# Patient Record
Sex: FEMALE | Race: WHITE | NOT HISPANIC OR LATINO | URBAN - METROPOLITAN AREA
[De-identification: names, ages, dates, MRNs, and addresses within clinical notes are randomized per-mention and may not be internally consistent; named-entity substitution may affect disease eponyms.]

---

## 2020-01-02 ENCOUNTER — EMERGENCY (EMERGENCY)
Facility: HOSPITAL | Age: 50
LOS: 1 days | Discharge: HOME | End: 2020-01-02
Attending: EMERGENCY MEDICINE | Admitting: EMERGENCY MEDICINE
Payer: COMMERCIAL

## 2020-01-02 VITALS
HEART RATE: 110 BPM | WEIGHT: 225.09 LBS | OXYGEN SATURATION: 100 % | TEMPERATURE: 98 F | SYSTOLIC BLOOD PRESSURE: 184 MMHG | DIASTOLIC BLOOD PRESSURE: 122 MMHG | RESPIRATION RATE: 14 BRPM

## 2020-01-02 VITALS
TEMPERATURE: 98 F | SYSTOLIC BLOOD PRESSURE: 160 MMHG | HEART RATE: 77 BPM | OXYGEN SATURATION: 100 % | RESPIRATION RATE: 18 BRPM | DIASTOLIC BLOOD PRESSURE: 70 MMHG

## 2020-01-02 LAB
ALBUMIN SERPL ELPH-MCNC: 4 G/DL — SIGNIFICANT CHANGE UP (ref 3.3–5)
ALP SERPL-CCNC: 92 U/L — SIGNIFICANT CHANGE UP (ref 40–120)
ALT FLD-CCNC: 29 U/L — SIGNIFICANT CHANGE UP (ref 12–78)
ANION GAP SERPL CALC-SCNC: 11 MMOL/L — SIGNIFICANT CHANGE UP (ref 5–17)
AST SERPL-CCNC: 57 U/L — HIGH (ref 15–37)
BILIRUB SERPL-MCNC: 0.6 MG/DL — SIGNIFICANT CHANGE UP (ref 0.2–1.2)
BUN SERPL-MCNC: 16 MG/DL — SIGNIFICANT CHANGE UP (ref 7–23)
CALCIUM SERPL-MCNC: 8.1 MG/DL — LOW (ref 8.5–10.1)
CHLORIDE SERPL-SCNC: 98 MMOL/L — SIGNIFICANT CHANGE UP (ref 96–108)
CO2 SERPL-SCNC: 24 MMOL/L — SIGNIFICANT CHANGE UP (ref 22–31)
CREAT SERPL-MCNC: 0.76 MG/DL — SIGNIFICANT CHANGE UP (ref 0.5–1.3)
GLUCOSE SERPL-MCNC: 132 MG/DL — HIGH (ref 70–99)
HCG SERPL-ACNC: <1 MIU/ML — SIGNIFICANT CHANGE UP
HCT VFR BLD CALC: 38.8 % — SIGNIFICANT CHANGE UP (ref 34.5–45)
HGB BLD-MCNC: 13 G/DL — SIGNIFICANT CHANGE UP (ref 11.5–15.5)
MCHC RBC-ENTMCNC: 30.9 PG — SIGNIFICANT CHANGE UP (ref 27–34)
MCHC RBC-ENTMCNC: 33.5 GM/DL — SIGNIFICANT CHANGE UP (ref 32–36)
MCV RBC AUTO: 92.2 FL — SIGNIFICANT CHANGE UP (ref 80–100)
NRBC # BLD: 0 /100 WBCS — SIGNIFICANT CHANGE UP (ref 0–0)
PLATELET # BLD AUTO: 289 K/UL — SIGNIFICANT CHANGE UP (ref 150–400)
POTASSIUM SERPL-MCNC: 3.5 MMOL/L — SIGNIFICANT CHANGE UP (ref 3.5–5.3)
POTASSIUM SERPL-SCNC: 3.5 MMOL/L — SIGNIFICANT CHANGE UP (ref 3.5–5.3)
PROT SERPL-MCNC: 7.4 G/DL — SIGNIFICANT CHANGE UP (ref 6–8.3)
RBC # BLD: 4.21 M/UL — SIGNIFICANT CHANGE UP (ref 3.8–5.2)
RBC # FLD: 12.6 % — SIGNIFICANT CHANGE UP (ref 10.3–14.5)
SODIUM SERPL-SCNC: 133 MMOL/L — LOW (ref 135–145)
TROPONIN I SERPL-MCNC: <.015 NG/ML — SIGNIFICANT CHANGE UP (ref 0.01–0.04)
WBC # BLD: 6.27 K/UL — SIGNIFICANT CHANGE UP (ref 3.8–10.5)
WBC # FLD AUTO: 6.27 K/UL — SIGNIFICANT CHANGE UP (ref 3.8–10.5)

## 2020-01-02 PROCEDURE — 70450 CT HEAD/BRAIN W/O DYE: CPT | Mod: 26

## 2020-01-02 PROCEDURE — 84702 CHORIONIC GONADOTROPIN TEST: CPT

## 2020-01-02 PROCEDURE — 93010 ELECTROCARDIOGRAM REPORT: CPT

## 2020-01-02 PROCEDURE — 85027 COMPLETE CBC AUTOMATED: CPT

## 2020-01-02 PROCEDURE — 93005 ELECTROCARDIOGRAM TRACING: CPT

## 2020-01-02 PROCEDURE — 96376 TX/PRO/DX INJ SAME DRUG ADON: CPT

## 2020-01-02 PROCEDURE — 84484 ASSAY OF TROPONIN QUANT: CPT

## 2020-01-02 PROCEDURE — 71046 X-RAY EXAM CHEST 2 VIEWS: CPT | Mod: 26

## 2020-01-02 PROCEDURE — 70450 CT HEAD/BRAIN W/O DYE: CPT

## 2020-01-02 PROCEDURE — 96375 TX/PRO/DX INJ NEW DRUG ADDON: CPT

## 2020-01-02 PROCEDURE — 96374 THER/PROPH/DIAG INJ IV PUSH: CPT

## 2020-01-02 PROCEDURE — 99284 EMERGENCY DEPT VISIT MOD MDM: CPT | Mod: 25

## 2020-01-02 PROCEDURE — 99285 EMERGENCY DEPT VISIT HI MDM: CPT

## 2020-01-02 PROCEDURE — 71046 X-RAY EXAM CHEST 2 VIEWS: CPT

## 2020-01-02 PROCEDURE — 36415 COLL VENOUS BLD VENIPUNCTURE: CPT

## 2020-01-02 PROCEDURE — 80053 COMPREHEN METABOLIC PANEL: CPT

## 2020-01-02 RX ORDER — HYDROCHLOROTHIAZIDE 25 MG
12.5 TABLET ORAL ONCE
Refills: 0 | Status: COMPLETED | OUTPATIENT
Start: 2020-01-02 | End: 2020-01-02

## 2020-01-02 RX ORDER — LOSARTAN POTASSIUM 100 MG/1
50 TABLET, FILM COATED ORAL ONCE
Refills: 0 | Status: COMPLETED | OUTPATIENT
Start: 2020-01-02 | End: 2020-01-02

## 2020-01-02 RX ORDER — ONDANSETRON 8 MG/1
4 TABLET, FILM COATED ORAL ONCE
Refills: 0 | Status: COMPLETED | OUTPATIENT
Start: 2020-01-02 | End: 2020-01-02

## 2020-01-02 RX ORDER — HYDRALAZINE HCL 50 MG
10 TABLET ORAL ONCE
Refills: 0 | Status: COMPLETED | OUTPATIENT
Start: 2020-01-02 | End: 2020-01-02

## 2020-01-02 RX ADMIN — Medication 10 MILLIGRAM(S): at 19:49

## 2020-01-02 RX ADMIN — Medication 12.5 MILLIGRAM(S): at 19:49

## 2020-01-02 RX ADMIN — ONDANSETRON 4 MILLIGRAM(S): 8 TABLET, FILM COATED ORAL at 16:30

## 2020-01-02 RX ADMIN — Medication 10 MILLIGRAM(S): at 18:01

## 2020-01-02 RX ADMIN — LOSARTAN POTASSIUM 50 MILLIGRAM(S): 100 TABLET, FILM COATED ORAL at 19:48

## 2020-01-02 NOTE — ED PROVIDER NOTE - PROGRESS NOTE DETAILS
29-Oct-2017 05:16 Pt seen by Dr INGRIS Laughlin. Hydralazine now, outpt fu when improved. Pt with some HTN - given usual BP med (pt took pta but immed vomited it up). José Laughlin - when bp improving, outpt fu. At length discussion with patient regarding nature of the HTN. Discussed results of all diagnostic testing in ED. Discussed chest pain /palp.sob / htn precautions and instructions. Patient demonstrates understanding that a cardiac cause of the chest pain has not been totally excluded, but at this time there is no evidence to warrant an inpatient workup.  Discussed the importance of continued follow-up with Cardiology as outpatient to complete cardiac workup.

## 2020-01-02 NOTE — ED PROVIDER NOTE - OBJECTIVE STATEMENT
50 yo F with hx HTN, no known cardiac dz p/w co feeling dizzy today, co HTN. Pt was at work this am and was feeling some dizzy / dysequilibrium today. Pt was found to have  / 100. Pt then began to become anxious, co some palpitations. Pt then BP elevated further and coworker called EMS to bring pt for eval. No neck / back pain. no numb/ting/focal weak. No FAYE / easy fatigue. No Chest pains. No fever/chills. Minimal ha. Pt vomited x 1 today after lying down. No agg/allev factors. No other inj or co. Pt had reportedly taken her BP med late today.

## 2020-01-02 NOTE — ED PROVIDER NOTE - PATIENT PORTAL LINK FT
You can access the FollowMyHealth Patient Portal offered by [facility] by registering at the following website: [email or web address]. By joining [employer]’s FollowMyHealth portal, you will also be able to view your health information using other applications (apps) compatible with our system.

## 2020-01-02 NOTE — ED ADULT NURSE NOTE - OBJECTIVE STATEMENT
Present to ER with c/o of dizziness and high blood pressure. Pt states she forgot to take her BP medication today. Denies any shortness of breath. Pt had 1 episode of vomiting.

## 2020-01-02 NOTE — ED PROVIDER NOTE - CARE PROVIDER_API CALL
Faustino Laughlin)  Cardiovascular Disease; Internal Medicine  [address] [facility]  [place] [address]  Phone: [phone]  Fax: [phone]  Follow Up Time:

## 2020-01-02 NOTE — CONSULT NOTE ADULT - ASSESSMENT
The patient is a 49 year old female with a history of HTN, hypothyroidism who presents with lightheadedness, presyncope.    Plan:  - Unclear etiology of symptoms - vertigo? BP related?  - ECG with underlying LBBB - she was told she has had this in the past but did not have an echo done  - Check one set of cardiac enzymes  - CXR with no infiltrates  - Check CT head  - Give hydralazine 10 mg IV now  - Patient will continue olmesartan at the current dose consistently and start to check her BPs at home. If they remain elevated, then additional antihypertensive medications will need to be added.  - If above negative, patient can be discharged and follow-up as outpatient for an echo and BP visit

## 2020-01-02 NOTE — ED ADULT NURSE NOTE - NSIMPLEMENTINTERV_GEN_ALL_ED
Implemented All Universal Safety Interventions:  [place] to call system. Call bell, personal items and telephone within reach. Instruct patient to call for assistance. Room bathroom lighting operational. Non-slip footwear when patient is off stretcher. Physically safe environment: no spills, clutter or unnecessary equipment. Stretcher in lowest position, wheels locked, appropriate side rails in place.

## 2020-01-02 NOTE — CONSULT NOTE ADULT - SUBJECTIVE AND OBJECTIVE BOX
History of Present Illness: The patient is a 49 year old female with a history of HTN who presents with lightheadedness, presyncope. She states she has not taken her BP meds for the last few days. She woke up feeling not well. She was lightheaded at times and also nauseous. 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 She went home; nausea persisted and she felt more lightheaded as if she was going to pass out. She took her antihypertensive medication but vomited shortly after.    Past Medical/Surgical History:  HTN    Medications:  Olmesartan    Family History: Non-contributory family history of premature cardiovascular atherosclerotic disease    Social History: No tobacco, alcohol or drug use    Review of Systems:  General: No fevers, chills, weight loss or gain  Skin: No rashes, color changes  Cardiovascular: No chest pain, orthopnea  Respiratory: No shortness of breath, cough  Gastrointestinal: No nausea, abdominal pain  Genitourinary: No incontinence, pain with urination  Musculoskeletal: No pain, swelling, decreased range of motion  Neurological: No headache, weakness  Psychiatric: No depression, anxiety  Endocrine: No weight loss or gain, increased thirst  All other systems are comprehensively negative.    Physical Exam:  Vitals:        Vital Signs Last 24 Hrs  T(C): 36.8 (02 Jan 2020 15:08), Max: 36.8 (02 Jan 2020 15:08)  T(F): 98.2 (02 Jan 2020 15:08), Max: 98.2 (02 Jan 2020 15:08)  HR: 110 (02 Jan 2020 15:08) (110 - 110)  BP: 175/94 (02 Jan 2020 16:23) (175/94 - 184/122)  BP(mean): --  RR: 14 (02 Jan 2020 15:08) (14 - 14)  SpO2: 100% (02 Jan 2020 15:08) (100% - 100%)  General: NAD  HEENT: MMM  Neck: No JVD, no carotid bruit  Lungs: CTAB  CV: RRR, nl S1/S2, no M/R/G  Abdomen: S/NT/ND, +BS  Extremities: No LE edema, no cyanosis  Neuro: AAOx3, non-focal  Skin: No rash    Labs:  Pending    ECG: NSR, LBBB

## 2020-01-03 RX ORDER — LEVOTHYROXINE SODIUM 125 MCG
1 TABLET ORAL
Qty: 0 | Refills: 0 | DISCHARGE

## 2020-01-03 RX ORDER — OLMESARTAN MEDOXOMIL-HYDROCHLOROTHIAZIDE 25; 40 MG/1; MG/1
1 TABLET, FILM COATED ORAL
Qty: 0 | Refills: 0 | DISCHARGE

## 2022-01-02 ENCOUNTER — OUTPATIENT (OUTPATIENT)
Dept: OUTPATIENT SERVICES | Facility: HOSPITAL | Age: 52
LOS: 1 days | End: 2022-01-02
Payer: COMMERCIAL

## 2022-01-02 DIAGNOSIS — Z20.828 CONTACT WITH AND (SUSPECTED) EXPOSURE TO OTHER VIRAL COMMUNICABLE DISEASES: ICD-10-CM

## 2022-01-02 PROCEDURE — U0003: CPT

## 2022-01-02 PROCEDURE — U0005: CPT

## 2023-01-17 ENCOUNTER — APPOINTMENT (OUTPATIENT)
Dept: ORTHOPEDIC SURGERY | Facility: CLINIC | Age: 53
End: 2023-01-17
Payer: OTHER MISCELLANEOUS

## 2023-01-17 DIAGNOSIS — S32.2XXA FRACTURE OF COCCYX, INITIAL ENCOUNTER FOR CLOSED FRACTURE: ICD-10-CM

## 2023-01-17 DIAGNOSIS — I10 ESSENTIAL (PRIMARY) HYPERTENSION: ICD-10-CM

## 2023-01-17 PROCEDURE — 99072 ADDL SUPL MATRL&STAF TM PHE: CPT

## 2023-01-17 PROCEDURE — L1833: CPT | Mod: LT

## 2023-01-17 PROCEDURE — 99204 OFFICE O/P NEW MOD 45 MIN: CPT | Mod: 25

## 2023-01-17 NOTE — PHYSICAL EXAM
[Normal Mood and Affect] : normal mood and affect [Able to Communicate] : able to communicate [Well Developed] : well developed [Well Nourished] : well nourished [NL (90)] : forward flexion 90 degrees [NL (30)] : right lateral bending 30 degrees [Left] : left knee [NL (140)] : flexion 140 degrees [NL (0)] : extension 0 degrees [5___] : hamstring 5[unfilled]/5 [5mm] : openinmm [Fracture] : Fracture [AP] : anteroposterior [Lateral] : lateral [[place]] : skyline [FreeTextEntry8] : tender over tip coccyx only. [FreeTextEntry2] : angulated fx thru terminal segment [] : negative Lachmann [FreeTextEntry9] : intact joint spaces. Small area of comminution lateral border of the lat. tibial plateau. No depression. ?? fx??

## 2023-01-17 NOTE — HISTORY OF PRESENT ILLNESS
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 Felt a "pop" lt navjot before falling. Able to stand and walk. Had increasing pain and swelling lt knee next day. Icing at home. Went to urgent care  where xrays revealed a coccyx fx. OOW since her injury. Has finished a MDP w/ little relief. Worse sitting and standing.\par \par PMH: previous lt knee injury x 30 years ago treated with PT and did well since.\par          had a TPI L-spine > 10 years ago and did well. 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

## 2023-01-24 ENCOUNTER — FORM ENCOUNTER (OUTPATIENT)
Age: 53
End: 2023-01-24

## 2023-01-25 ENCOUNTER — APPOINTMENT (OUTPATIENT)
Dept: MRI IMAGING | Facility: CLINIC | Age: 53
End: 2023-01-25
Payer: OTHER MISCELLANEOUS

## 2023-01-25 PROCEDURE — 99072 ADDL SUPL MATRL&STAF TM PHE: CPT

## 2023-01-25 PROCEDURE — 73721 MRI JNT OF LWR EXTRE W/O DYE: CPT | Mod: LT

## 2023-01-31 ENCOUNTER — APPOINTMENT (OUTPATIENT)
Dept: ORTHOPEDIC SURGERY | Facility: CLINIC | Age: 53
End: 2023-01-31
Payer: OTHER MISCELLANEOUS

## 2023-01-31 VITALS — BODY MASS INDEX: 33.47 KG/M2 | HEIGHT: 69 IN | WEIGHT: 226 LBS

## 2023-01-31 DIAGNOSIS — S83.512A SPRAIN OF ANTERIOR CRUCIATE LIGAMENT OF LEFT KNEE, INITIAL ENCOUNTER: ICD-10-CM

## 2023-01-31 DIAGNOSIS — M23.92 UNSPECIFIED INTERNAL DERANGEMENT OF LEFT KNEE: ICD-10-CM

## 2023-01-31 DIAGNOSIS — Z78.9 OTHER SPECIFIED HEALTH STATUS: ICD-10-CM

## 2023-01-31 PROCEDURE — 99072 ADDL SUPL MATRL&STAF TM PHE: CPT

## 2023-01-31 PROCEDURE — 99213 OFFICE O/P EST LOW 20 MIN: CPT

## 2023-01-31 NOTE — HISTORY OF PRESENT ILLNESS
[Gradual] : gradual [Sudden] : sudden [7] : 7 [Stabbing] : stabbing [Intermittent] : intermittent [Rest] : rest [Exercising] : exercising [Work related] : work related [0] : 0 [Tightness] : tightness [Full time] : Work status: full time [de-identified] :  01/11/23\par \par 01/31/23:   F/U.  Is now about three weeks after injury at work.  Here today for left knee MRI results.  Is doing a little better.  Still has some complaints of medial left knee pain.  Is working full duty despite the injury.  No longer complaining of coccyx pain.\par \par Impression: \par 1. ACL mucoid change with poor visualization of the femoral origin compatible with at least high-grade tear.\par Nondepressed subchondral fracture of the posterior lateral tibia with contusion versus reactive marrow edema \par from cartilage loss posterior medial tibia. Joint effusion.\par 2. Medial meniscal tear.\par 3. Hamstring and gastrocnemius tendinopathy with soft tissue edema.\par 4. 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 Felt a "pop" lt navjot before falling. Able to stand and walk. Had increasing pain and swelling lt knee next day. Icing at home. Went to urgent care  where xrays revealed a coccyx fx. OOW since her injury. Has finished a MDP w/ little relief. Worse sitting and standing.\par \par PMH: previous lt knee injury x 30 years ago treated with PT and did well since.\par          had a TPI L-spine > 10 years ago and did well. 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

## 2023-01-31 NOTE — PHYSICAL EXAM
[Normal Mood and Affect] : normal mood and affect [Able to Communicate] : able to communicate [Well Developed] : well developed [Well Nourished] : well nourished [NL (90)] : forward flexion 90 degrees [NL (30)] : right lateral bending 30 degrees [Fracture] : Fracture [Left] : left knee [NL (140)] : flexion 140 degrees [NL (0)] : extension 0 degrees [5___] : hamstring 5[unfilled]/5 [5mm] : openinmm [AP] : anteroposterior [Lateral] : lateral [[place]] : skyline [FreeTextEntry2] : angulated fx thru terminal segment [Positive] : positive anterior draw [FreeTextEntry8] : tender over tip coccyx only. [] : non-antalgic [FreeTextEntry9] : intact joint spaces. Small area of comminution lateral border of the lat. tibial plateau. No depression. ?? fx??

## 2023-01-31 NOTE — DISCUSSION/SUMMARY
[de-identified] : "Written by Opal Carvalho, acting as Scribe for Alverto Johnson MD."\par \par Dr. Johnson - \par The documentation recorded by the scribe accurately reflects the service I personally performed and the decisions made by me.

## 2023-01-31 NOTE — WORK
[Torn Ligament/Tendon/Muscle] : torn ligament, tendon or muscle [Was the competent medical cause of the injury] : was the competent medical cause of the injury [Are consistent with the injury] : are consistent with the injury [Consistent with my objective findings] : consistent with my objective findings [Partial] : partial [Does not reveal pre-existing condition(s) that may affect treatment/prognosis] : does not reveal pre-existing condition(s) that may affect treatment/prognosis [I provided the services listed above] :  I provided the services listed above. [FreeTextEntry1] : uncertain.  She is working despite pain.

## 2023-02-28 ENCOUNTER — APPOINTMENT (OUTPATIENT)
Dept: ORTHOPEDIC SURGERY | Facility: CLINIC | Age: 53
End: 2023-02-28
Payer: OTHER MISCELLANEOUS

## 2023-02-28 VITALS — WEIGHT: 226 LBS | BODY MASS INDEX: 33.47 KG/M2 | HEIGHT: 69 IN

## 2023-02-28 DIAGNOSIS — S83.512D SPRAIN OF ANTERIOR CRUCIATE LIGAMENT OF LEFT KNEE, SUBSEQUENT ENCOUNTER: ICD-10-CM

## 2023-02-28 PROCEDURE — 99072 ADDL SUPL MATRL&STAF TM PHE: CPT

## 2023-02-28 PROCEDURE — 99213 OFFICE O/P EST LOW 20 MIN: CPT

## 2023-02-28 NOTE — WORK
[Torn Ligament/Tendon/Muscle] : torn ligament, tendon or muscle [Was the competent medical cause of the injury] : was the competent medical cause of the injury [Are consistent with the injury] : are consistent with the injury [Consistent with my objective findings] : consistent with my objective findings [Partial] : partial [Does not reveal pre-existing condition(s) that may affect treatment/prognosis] : does not reveal pre-existing condition(s) that may affect treatment/prognosis [I provided the services listed above] :  I provided the services listed above. [Can return to work without limitations on ______] : can return to work without limitations on [unfilled] [FreeTextEntry1] : uncertain.  She is working despite pain.

## 2023-02-28 NOTE — PHYSICAL EXAM
[Normal Mood and Affect] : normal mood and affect [Able to Communicate] : able to communicate [Well Developed] : well developed [Well Nourished] : well nourished [NL (90)] : forward flexion 90 degrees [NL (30)] : right lateral bending 30 degrees [Left] : left knee [NL (140)] : flexion 140 degrees [NL (0)] : extension 0 degrees [5___] : hamstring 5[unfilled]/5 [Positive] : positive anterior draw [5mm] : openinmm [AP] : anteroposterior [Lateral] : lateral [[place]] : skyline [FreeTextEntry8] : tender over tip coccyx only. [] : non-antalgic [FreeTextEntry9] : intact joint spaces. Small area of comminution lateral border of the lat. tibial plateau. No depression. ?? fx??

## 2023-02-28 NOTE — HISTORY OF PRESENT ILLNESS
[0] : 0 [Stabbing] : stabbing [Tightness] : tightness [Rest] : rest [Exercising] : exercising [6] : 6 [de-identified] :  01/11/23\par \par 02/28/23:  WC F/U.  Is now about 7 weeks after injury at work. Still c/o some pain and buckling lt knee despite PT 2x/week. Interested in ACL recon and menisectomy. Still working full duty.\par \par 01/31/23:  WC F/U.  Is now about three weeks after injury at work.  Here today for left knee MRI results.  Is doing a little better.  Still has some complaints of medial left knee pain.  Is working full duty despite the injury.  No longer complaining of coccyx pain.\par \par Impression: \par 1. ACL mucoid change with poor visualization of the femoral origin compatible with at least high-grade tear.\par Nondepressed subchondral fracture of the posterior lateral tibia with contusion versus reactive marrow edema \par from cartilage loss posterior medial tibia. Joint effusion.\par 2. Medial meniscal tear.\par 3. Hamstring and gastrocnemius tendinopathy with soft tissue edema.\par 4. 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 Felt a "pop" lt navjot before falling. Able to stand and walk. Had increasing pain and swelling lt knee next day. Icing at home. Went to urgent care  where xrays revealed a coccyx fx. OOW since her injury. Has finished a MDP w/ little relief. Worse sitting and standing.\par \par PMH: previous lt knee injury x 30 years ago treated with PT and did well since.\par          had a TPI L-spine > 10 years ago and did well. [] : no [FreeTextEntry5] : patient would like to discuss surgical options today.  [de-identified] : Teacher

## 2023-03-06 ENCOUNTER — APPOINTMENT (OUTPATIENT)
Dept: ORTHOPEDIC SURGERY | Facility: CLINIC | Age: 53
End: 2023-03-06
Payer: OTHER MISCELLANEOUS

## 2023-03-06 VITALS — WEIGHT: 226 LBS | BODY MASS INDEX: 33.47 KG/M2 | HEIGHT: 69 IN

## 2023-03-06 DIAGNOSIS — Z86.39 PERSONAL HISTORY OF OTHER ENDOCRINE, NUTRITIONAL AND METABOLIC DISEASE: ICD-10-CM

## 2023-03-06 PROCEDURE — 99072 ADDL SUPL MATRL&STAF TM PHE: CPT

## 2023-03-06 PROCEDURE — 99214 OFFICE O/P EST MOD 30 MIN: CPT

## 2023-03-08 NOTE — HISTORY OF PRESENT ILLNESS
[de-identified] : The patient is a 52 year old right hand dominant female who presents today complaining of left knee pain.  \par Date of Injury/Onset: 1/11/2023\par Pain:    At Rest: 3/10 \par With Activity:  8/10 \par Mechanism of injury: Twisted knee while opening door at work\par This is a Work Related Injury being treated under Worker's Compensation.\par This is NOT an athletic injury occurring associated with an interscholastic or organized sports team.\par Quality of symptoms: instability and pain\par Improves with: rest\par Worse with: activity \par Prior treatment: Physical therapy \par Prior Imaging: MRI left knee \par Out of work/sport: No, since [currently working]\par School/Sport/Position/Occupation: Teacher \par Additional Information: None\par

## 2023-03-08 NOTE — DATA REVIEWED
[MRI] : MRI [Left] : left [Knee] : knee [Report was reviewed and noted in the chart] : The report was reviewed and noted in the chart [I independently reviewed and interpreted images and report] : I independently reviewed and interpreted images and report [I reviewed the films/CD and additionally noted] : I reviewed the films/CD and additionally noted [FreeTextEntry1] : MRI left knee reveals medial meniscus tear with unstable flap, bone contusion of lateral tibial plateau, and mucoid degeneration but intact anterior cruciate ligament.

## 2023-03-08 NOTE — IMAGING
[de-identified] : The patient is a well appearing 52 year  old female of their stated age. \par Patient ambulates with a normal gait. \par Negative straight leg raise bilateral \par \par Effected Knee: LEFT                        	 \par ROM:  -5-140 degrees \par Lachman: Negative \par Pivot Shift: Negative \par Anterior Drawer: Negative \par Posterior Drawer / Sag:Negative \par Varus Stress 0 degrees: Stable \par Varus Stress 30 degrees: UNSTABLE, 2+ gapping \par Valgus Stress 0 degrees: Stable \par Valgus Stress 30 degrees: UNSTABLE, 2+ gapping \par Medial Shady: POSITIVE \par Lateral Shady: Negative \par Patella Glide: 2+ \par Patella Apprehension: Negative \par Patella Grind: Negative \par \par Palpation: \par Medial Joint Line: TENDER \par Lateral Joint Line: Nontender \par Medial Collateral Ligament: Nontender \par Lateral Collateral Ligament/PLC: Nontender \par Distal Femur: Nontender \par Proximal Tibia: Nontender \par Tibial Tubercle: Nontender \par Tibial Plateau: TENDER, LATERALLY \par Distal Pole Patella: Nontender \par Quadriceps Tendon: Nontender &  Intact \par Patella Tendon: Nontender &  Intact \par Medial Femoral Condyle: Nontender \par Medial Distal Hamstring/PES: Nontender \par Lateral Distal Hamstring: Nontender & Stable \par Iliotibial Band: Nontender \par Medial Patellofemoral Ligament: Nontender \par Adductor: Nontender \par Proximal GSC-Plantaris: Nontender \par Calf: Supple & Nontender \par \par Inspection: \par Deformity: No \par Erythema: No \par Ecchymosis: No \par Abrasions: No \par Effusion: No \par Prepatella Bursitis: No \par Neurologic Exam: \par Sensation L4-S1: Grossly Intact \par Motor Exam: \par Quadriceps: 5 out of 5 \par Hamstrings: 5 out of 5 \par EHL: 5 out of 5 \par FHL: 5 out of 5 \par TA: 5 out of 5 \par GS: 5 out of 5 \par Circulatory/Pulses: \par Dorsalis Pedis: 2+ \par Posterior Tibialis: 2+ \par Additional Pertinent Findings: None \par \par Contralateral Knee:                           	 \par ROM: 0-145 degrees \par Other Pertinent Findings: Varus and Valgus instability at 30 degrees with 2+ gapping \par \par Assessment: The patient is a 52 year  old female  with left knee pain and radiographic and physical exam findings consistent with medial meniscus tear, contusion of lateral tibial plateau.   \par The patient’s condition is acute \par Documents/Results Reviewed Today: MRI left knee \par Tests/Studies Independently Interpreted Today: MRI left knee reveals medial meniscus tear with unstable flap, bone contusion of lateral tibial plateau, and mucoid degeneration but intact anterior cruciate ligament. \par Pertinent findings include:  -5-140, varus and valgus instability with 2+ gapping, MJLT, + medial Shady, tender lateral tibial plateau \par Confounding medical conditions/concerns: None\par \par Plan: Discussed non-operative and operative treatment options including left knee arthroscopic partial medial meniscectomy and any indicated procedures. All questions and concerns regarding the surgery were addressed. Went over the recovery timeline and expected outcomes following surgery. Patient elected to move forward with the surgical procedure. Patient has proven refractory to all previous conservative treatment modalities including but not limited to physical therapy, home exercises, activity modifications, rest, ice, antiinflammatories, bracing, etc. \par Tests Ordered: Pre-op and COVID \par Prescription Medications Ordered: Discussed appropriate use of OTC anti-inflammatories and topical analgesics (including but not limited to Aleve, Advil, Tylenol, Motrin, Ibuprofen, Voltaren gel, etc.)\par Braces/DME Ordered: None \par Activity/Work/Sports Status: None \par Additional Instructions: None\par Follow-Up: 2 weeks post-op \par \par The patient's current medication management of their orthopedic diagnosis was reviewed today:\par (1) We discussed a comprehensive treatment plan that included possible pharmaceutical management involving the use of prescription strength medications including but not limited to options such as oral Naprosyn 500mg BID, once daily Meloxicam 15 mg, or 500-650 mg Tylenol versus over the counter oral medications and topical prescription NSAID Pennsaid vs over the counter Voltaren gel.  Based on our extensive discussion, the patient declined prescription medication and will use over the counter Advil, Alleve, Voltaren Gel or Tylenol as directed.\par (2) There is a moderate risk of morbidity with further treatment, especially from use of prescription strength medications and possible side effects of these medications which include upset stomach with oral medications, skin reactions to topical medications and cardiac/renal issues with long term use.\par (3) I recommended that the patient follow-up with their medical physician to discuss any significant specific potential issues with long term medication use such as interactions with current medications or with exacerbation of underlying medical comorbidities.\par (4) The benefits and risks associated with use of injectable, oral or topical, prescription and over the counter anti-inflammatory medications were discussed with the patient. The patient voiced understanding of the risks including but not limited to bleeding, stroke, kidney dysfunction, heart disease, and were referred to the black box warning label for further information. \par \par Consent:  Conservative treatment, nontreatment, nonsurgical intervention and surgical intervention treatment options have been reviewed with the patient.  The patient continues to be symptomatic and has failed conservative treatment, and elects to move forward with surgical intervention.  The patient is indicated for  left knee arthroscopic partial medial meniscectomy  and all indicated procedures. As such the alternatives, benefits and risks, of the above procedure, including but not limited to bleeding, infection, neurovascular injury, loss of limb, loss of life,  DVT, PE, RSD, inability to return to previous level of activity, inability to return to previous level of employment, advancement of or to osteoarthritic changes, joint instability or motion loss, hardware failure or migration, failure to resolve all symptoms, failure to return to sports and need for further procedures, as well as specific risk of need for future joint arthroplasty were discussed with the patient and/or their legal guardian who agreed to move forward with surgical intervention.  They have reviewed and signed the consent form today after expressing understanding of the above documented conversation. The patient or their representative will contact my office as instructed on the preoperative instruction sheet they received today to schedule surgery in a timely manner as discussed.\par Over 25 minutes were spent on this encounter including time with the patient and over 15 minutes spent on counseling and coordination of care.\par \par \par \par Zakia GIRALDO attest that this documentation has been prepared under the direction and in the presence of Provider Dr. Willie Treviño. \par \par The documentation recorded by the scribe accurately reflects the services I, Dr. Willie Treviño, personally performed and the decisions made by me.\par

## 2023-04-03 ENCOUNTER — FORM ENCOUNTER (OUTPATIENT)
Age: 53
End: 2023-04-03

## 2023-04-12 ENCOUNTER — FORM ENCOUNTER (OUTPATIENT)
Age: 53
End: 2023-04-12

## 2023-05-15 RX ORDER — ONDANSETRON 4 MG/1
4 TABLET ORAL
Qty: 15 | Refills: 0 | Status: ACTIVE | COMMUNITY
Start: 2023-05-15 | End: 1900-01-01

## 2023-05-15 RX ORDER — DOCUSATE SODIUM 100 MG/1
100 CAPSULE ORAL 3 TIMES DAILY
Qty: 21 | Refills: 0 | Status: ACTIVE | COMMUNITY
Start: 2023-05-15 | End: 1900-01-01

## 2023-05-15 RX ORDER — HYDROCODONE BITARTRATE AND ACETAMINOPHEN 5; 325 MG/1; MG/1
5-325 TABLET ORAL
Qty: 30 | Refills: 0 | Status: ACTIVE | COMMUNITY
Start: 2023-05-15 | End: 1900-01-01

## 2023-05-17 ENCOUNTER — APPOINTMENT (OUTPATIENT)
Dept: ORTHOPEDIC SURGERY | Facility: AMBULATORY SURGERY CENTER | Age: 53
End: 2023-05-17
Payer: OTHER MISCELLANEOUS

## 2023-05-17 PROCEDURE — 29881 ARTHRS KNE SRG MNISECTMY M/L: CPT | Mod: AS,LT

## 2023-05-17 PROCEDURE — 29881 ARTHRS KNE SRG MNISECTMY M/L: CPT | Mod: LT

## 2023-05-26 ENCOUNTER — APPOINTMENT (OUTPATIENT)
Dept: ORTHOPEDIC SURGERY | Facility: CLINIC | Age: 53
End: 2023-05-26
Payer: OTHER MISCELLANEOUS

## 2023-05-26 VITALS — WEIGHT: 226 LBS | BODY MASS INDEX: 33.47 KG/M2 | HEIGHT: 69 IN

## 2023-05-26 PROCEDURE — 99024 POSTOP FOLLOW-UP VISIT: CPT

## 2023-05-26 NOTE — WORK
[Torn Ligament/Tendon/Muscle] : torn ligament, tendon or muscle [Was the competent medical cause of the injury] : was the competent medical cause of the injury [Are consistent with the injury] : are consistent with the injury [Consistent with my objective findings] : consistent with my objective findings [Total (100%)] : total (100%) [Does not reveal pre-existing condition(s) that may affect treatment/prognosis] : does not reveal pre-existing condition(s) that may affect treatment/prognosis [Can return to work without limitations on ______] : can return to work without limitations on [unfilled] [Patient] : patient [No Rx restrictions] : No Rx restrictions. [I provided the services listed above] :  I provided the services listed above.

## 2023-05-26 NOTE — HISTORY OF PRESENT ILLNESS
[de-identified] : The patient is s/p left knee EUA arthroscopic PMM\par Date of Surgery: 5/17/23\par Pain:    At Rest: 4/10 \par With Activity:  6-8/10 \par Mechanism of injury: Twisted knee while opening door at work\par This is a Work Related Injury being treated under Worker's Compensation.\par This is NOT an athletic injury occurring associated with an interscholastic or organized sports team.\par Treatment/Imaging/Studies Since Last Visit: surgery, PT\par 	Reports Available For Review Today: op notes, op pictures\par Out of work/sport: out of work since 5/17/23\par School/Sport/Position/Occupation: Teacher\par Change since last visit: Doing well post operatively, denies fevers/chills\par Additional Information: None\par \par

## 2023-05-26 NOTE — PHYSICAL EXAM
[de-identified] : The patient is a well appearing 52 year old female of their stated age.\par Patient ambulates with a normal gait.\par Negative straight leg raise bilateral\par \par Surgical site: left knee\par \par Incision sites: Well approximated, clean, dry, intact, without drainage, without erythema \par \par Range of motion: 0-120\par \par Motor Testing: Quad firing, able to SLR \par \par Stability Testing: Limited by pain \par \par Swelling/Effusion: None \par \par Tenderness to palpation: None \par \par Provocative testing: Limited by pain \par \par Right Calf: soft and nontender \par Left Calf: soft and nontender \par  \par Neurovascular Examination: Grossly intact, 2+ distal pulses \par Contralateral Extremity: Examination grossly benign \par \par \par Assessment & Plan: The patient is approximately 2 weeks s/p left knee EUA arthroscopic partial medial meniscectomy (DOS: 05/17/23). Sutures removed and Steri Strips applied today. The patient is instructed on wound management. The patient's post-op plan, protocol and activity modifications have been thoroughly discussed and the patient expressed understanding. Continue physical therapy. The patient will control pain as discussed & continue ice and elevation as needed. The patient otherwise may advance activity as discussed. The patient will return to work without restrictions on 6/12/23. Follow up 4 weeks. \par  \par The patient's current medication management of their orthopedic diagnosis was reviewed today:\par (1) The patient will continue with the PRN use of their prescribed anti-inflammatory.\par (2) There is a moderate risk of morbidity with further treatment, especially from use of prescription strength medications and possible side effects of these medications which include upset stomach with oral medications, skin reactions to topical medications and cardiac/renal issues with long term use.\par (3) I recommended that the patient follow-up with their medical physician to discuss any significant specific potential issues with long term medication use such as interactions with current medications or with exacerbation of underlying medical comorbidities.\par (4) The benefits and risks associated with use of injectable, oral or topical, prescription and over the counter anti-inflammatory medications were discussed with the patient. The patient voiced understanding of the risks including but not limited to bleeding, stroke, kidney dysfunction, heart disease, and were referred to the black box warning label for further information. \par \par Zakia GIRALDO attest that this documentation has been prepared under the direction and in the presence of Vishal Hein PA-C \par \par The documentation recorded by the scribe accurately reflects the service I Vishal Hein PA-C personally performed and the decisions made by me.\par The patient was seen by me under the direct supervision of Dr. Willie Treviño\par

## 2023-06-26 ENCOUNTER — APPOINTMENT (OUTPATIENT)
Dept: ORTHOPEDIC SURGERY | Facility: CLINIC | Age: 53
End: 2023-06-26
Payer: OTHER MISCELLANEOUS

## 2023-06-26 VITALS — WEIGHT: 226 LBS | HEIGHT: 69 IN | BODY MASS INDEX: 33.47 KG/M2

## 2023-06-26 PROCEDURE — 99024 POSTOP FOLLOW-UP VISIT: CPT

## 2023-06-26 NOTE — WORK
[Torn Ligament/Tendon/Muscle] : torn ligament, tendon or muscle [Was the competent medical cause of the injury] : was the competent medical cause of the injury [Are consistent with the injury] : are consistent with the injury [Consistent with my objective findings] : consistent with my objective findings [Does not reveal pre-existing condition(s) that may affect treatment/prognosis] : does not reveal pre-existing condition(s) that may affect treatment/prognosis [Can return to work without limitations on ______] : can return to work without limitations on [unfilled] [Patient] : patient [No Rx restrictions] : No Rx restrictions. [I provided the services listed above] :  I provided the services listed above. [Partial] : partial

## 2023-06-27 NOTE — HISTORY OF PRESENT ILLNESS
[de-identified] : The patient is s/p left knee EUA arthroscopic PMM\par Date of Surgery: 5/17/23\par Pain:    At Rest: 0/10 \par With Activity:  2-3/10 \par Mechanism of injury: Twisted knee while opening door at work\par This is a Work Related Injury being treated under Worker's Compensation.\par This is NOT an athletic injury occurring associated with an interscholastic or organized sports team.\par Treatment/Imaging/Studies Since Last Visit: surgery, PT\par 	Reports Available For Review Today: op notes, op pictures\par Out of work/sport: Returned to work after previous visit \par School/Sport/Position/Occupation: Teacher\par 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

## 2023-06-27 NOTE — PHYSICAL EXAM
[de-identified] : The patient is a well appearing 52 year old female of their stated age.\par Patient ambulates with a normal gait.\par Negative straight leg raise bilateral\par \par Surgical site: Left knee\par \par Incision sites: Well healed \par \par Range of motion: 0-145\par \par Motor Testin-/5 quad strength \par \par Stability Testing: Stable ligamentous exam \par \par Swelling/Effusion: None \par \par Tenderness to palpation: None \par \par Provocative testing: Negative \par \par Right Calf: soft and nontender \par Left Calf: soft and nontender \par  \par Neurovascular Examination: Grossly intact, 2+ distal pulses \par Contralateral Extremity: Examination grossly benign \par \par \par Assessment & Plan: The patient is approximately 6 weeks s/p left knee EUA arthroscopic partial medial meniscectomy (DOS: 23).The patient's post-op plan, protocol and activity modifications have been thoroughly discussed and the patient expressed understanding. Continue physical therapy. The patient will control pain as discussed & continue ice and elevation as needed. The patient otherwise may advance activity as discussed. The patient is working. Follow up 6 weeks. \par  \par The patient's current medication management of their orthopedic diagnosis was reviewed today:\par (1) We discussed a comprehensive treatment plan that included possible pharmaceutical management involving the use of prescription strength medications including but not limited to options such as oral Naprosyn 500mg BID, once daily Meloxicam 15 mg, or 500-650 mg Tylenol versus over the counter oral medications and topical prescription NSAID Pennsaid vs over the counter Voltaren gel.  Based on our extensive discussion, the patient declined prescription medication and will use over the counter Advil, Alleve, Voltaren Gel or Tylenol as directed.\par (2) There is a moderate risk of morbidity with further treatment, especially from use of prescription strength medications and possible side effects of these medications which include upset stomach with oral medications, skin reactions to topical medications and cardiac/renal issues with long term use.\par (3) I recommended that the patient follow-up with their medical physician to discuss any significant specific potential issues with long term medication use such as interactions with current medications or with exacerbation of underlying medical comorbidities.\par (4) The benefits and risks associated with use of injectable, oral or topical, prescription and over the counter anti-inflammatory medications were discussed with the patient. The patient voiced understanding of the risks including but not limited to bleeding, stroke, kidney dysfunction, heart disease, and were referred to the black box warning label for further information. \par \par Zakia GIRALDO attest that this documentation has been prepared under the direction and in the presence of Provider Dr. Willie Treviño. \par \par The documentation recorded by the scribe accurately reflects the service IRadha PA-C, personally performed and the decisions made by me.\par The patient was seen by me under the direct supervision of Dr. Willie Treviño.

## 2023-07-20 ENCOUNTER — APPOINTMENT (OUTPATIENT)
Dept: ORTHOPEDIC SURGERY | Facility: CLINIC | Age: 53
End: 2023-07-20
Payer: COMMERCIAL

## 2023-07-20 VITALS — WEIGHT: 226 LBS | HEIGHT: 69 IN | BODY MASS INDEX: 33.47 KG/M2

## 2023-07-20 DIAGNOSIS — Z00.00 ENCOUNTER FOR GENERAL ADULT MEDICAL EXAMINATION W/OUT ABNORMAL FINDINGS: ICD-10-CM

## 2023-07-20 DIAGNOSIS — M76.62 ACHILLES TENDINITIS, LEFT LEG: ICD-10-CM

## 2023-07-20 PROCEDURE — 99213 OFFICE O/P EST LOW 20 MIN: CPT | Mod: 24

## 2023-07-20 PROCEDURE — L4350: CPT | Mod: RT

## 2023-07-20 PROCEDURE — 73630 X-RAY EXAM OF FOOT: CPT | Mod: 50

## 2023-07-20 NOTE — PHYSICAL EXAM
[Right] : right foot and ankle [Mild] : mild swelling of medial ankle [Left] : left foot and ankle [2+] : dorsalis pedis pulse: 2+ [NL (20)] : dorsiflexion 20 degrees [NL (40)] : plantar flexion 40 degrees [5___] : eversion 5[unfilled]/5 [] : no calf tenderness

## 2023-07-20 NOTE — HISTORY OF PRESENT ILLNESS
[9] : 9 [5] : 5 [Dull/Aching] : dull/aching [Sharp] : sharp [Stabbing] : stabbing [de-identified] : 7/20/23: Pt is a 53 year old F who presents today for evaluation of their b/l feet. right >left. Pt states that she has been having ongoing pain since 2022. Pt fell at work and needed to get knee surgery with Dr Treviño which caused her to stop treating her feet. Pt has been to a podiatrist Feburary 2023, had 2 cortisone shots to both feet.  Hx of plantar fascitis on right foot. Heel spur in both. Hx L ankle sprain x 2.  Denies recent trauma.  Ice to affected area. Advil for pain.  WB in sneakers. [FreeTextEntry1] : b/l feet

## 2023-08-22 ENCOUNTER — APPOINTMENT (OUTPATIENT)
Dept: ORTHOPEDIC SURGERY | Facility: CLINIC | Age: 53
End: 2023-08-22
Payer: OTHER MISCELLANEOUS

## 2023-08-22 VITALS — WEIGHT: 226 LBS | BODY MASS INDEX: 33.47 KG/M2 | HEIGHT: 69 IN

## 2023-08-22 PROCEDURE — 99213 OFFICE O/P EST LOW 20 MIN: CPT | Mod: ACP

## 2023-08-22 NOTE — IMAGING
[de-identified] : The patient is a well appearing 52 year old female of their stated age. Patient ambulates with a normal gait. Negative straight leg raise bilateral  Surgical site: Left knee  Incision sites: Well healed   Range of motion: 0-145  Motor Testin/5 quad strength   Stability Testing: Stable ligamentous exam   Swelling/Effusion: None   Tenderness to palpation: None   Provocative testing: Negative   Right Calf: soft and nontender  Left Calf: soft and nontender    Neurovascular Examination: Grossly intact, 2+ distal pulses  Contralateral Extremity: Examination grossly benign   Assessment & Plan: The patient is approximately 12 weeks s/p left knee EUA arthroscopic partial medial meniscectomy (DOS: 23)..The patient's post-op plan, protocol and activity modifications have been thoroughly discussed and the patient expressed understanding. Patient will finish out physical therapy and transition to HEP and stretching. Advised patient to massage portal sites to reduce scar tissue formation. The patient is currently working. Patient is cleared for all activity. Return to gym and sports. Gradually advance activity as tolerated. Follow up 6 weeks.    The patient's current medication management of their orthopedic diagnosis was reviewed today: (1) The patient will discontinue their prescribed anti-inflammatory medication. (2) There is a moderate risk of morbidity with further treatment, especially from use of prescription strength medications and possible side effects of these medications which include upset stomach with oral medications, skin reactions to topical medications and cardiac/renal issues with long term use. (3) I recommended that the patient follow-up with their medical physician to discuss any significant specific potential issues with long term medication use such as interactions with current medications or with exacerbation of underlying medical comorbidities. (4) The benefits and risks associated with use of injectable, oral or topical, prescription and over the counter anti-inflammatory medications were discussed with the patient. The patient voiced understanding of the risks including but not limited to bleeding, stroke, kidney dysfunction, heart disease, and were referred to the black box warning label for further information.  Avelina GIRALDO, attest that this documentation has been prepared under the direction and in the presence of Radha Singletary PA-C.  The documentation recorded by the scribe accurately reflects the service Radha GIRALDO PA-C, personally performed and the decisions made by me.

## 2023-08-22 NOTE — WORK
[Torn Ligament/Tendon/Muscle] : torn ligament, tendon or muscle [Was the competent medical cause of the injury] : was the competent medical cause of the injury [Are consistent with the injury] : are consistent with the injury [Consistent with my objective findings] : consistent with my objective findings [Partial] : partial [Does not reveal pre-existing condition(s) that may affect treatment/prognosis] : does not reveal pre-existing condition(s) that may affect treatment/prognosis [Can return to work without limitations on ______] : can return to work without limitations on [unfilled] [Patient] : patient [No Rx restrictions] : No Rx restrictions. [I provided the services listed above] :  I provided the services listed above.

## 2023-08-22 NOTE — HISTORY OF PRESENT ILLNESS
[de-identified] : The patient is a 53 year old right hand dominant female who presents today for left knee follow up Date of Injury: 1/11/23; Date of Surgery: 5/17/23 Pain:    At Rest: 0/10  With Activity:  3/10  Mechanism of injury: Twisted knee while opening door at work This is a Work Related Injury being treated under Worker's Compensation. This is NOT an athletic injury occurring associated with an interscholastic or organized sports team. 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 Still has some c/o weakness in her leg Additional Information:  s/p left knee EUA arthroscopic PMM

## 2023-08-31 ENCOUNTER — APPOINTMENT (OUTPATIENT)
Dept: ORTHOPEDIC SURGERY | Facility: CLINIC | Age: 53
End: 2023-08-31
Payer: COMMERCIAL

## 2023-08-31 DIAGNOSIS — G57.91 UNSPECIFIED MONONEUROPATHY OF RIGHT LOWER LIMB: ICD-10-CM

## 2023-08-31 DIAGNOSIS — M76.71 PERONEAL TENDINITIS, RIGHT LEG: ICD-10-CM

## 2023-08-31 DIAGNOSIS — M76.822 POSTERIOR TIBIAL TENDINITIS, LEFT LEG: ICD-10-CM

## 2023-08-31 PROCEDURE — 99213 OFFICE O/P EST LOW 20 MIN: CPT

## 2023-08-31 NOTE — PHYSICAL EXAM
[Right] : right foot and ankle [Left] : left foot and ankle [NL (20)] : dorsiflexion 20 degrees [NL (40)] : plantar flexion 40 degrees [5___] : plantar flexion 5[unfilled]/5 [2+] : dorsalis pedis pulse: 2+ [Mild] : mild swelling of medial ankle [] : posterior tibialis tendon tenderness

## 2023-08-31 NOTE — DISCUSSION/SUMMARY
[de-identified] : Continue PT/HEP making gains with PT more pain to the left posterior tibial tendon area today right sided symptoms resolving, just with intermittent  tingling lateral foot; can consider EMG  continue WBAT in supportive shoes icing/elevation  f/u prn

## 2023-08-31 NOTE — HISTORY OF PRESENT ILLNESS
[5] : 5 [Dull/Aching] : dull/aching [Stabbing] : stabbing [4] : 4 [de-identified] : 7/20/23: Pt is a 53 year old F who presents today for evaluation of their b/l feet. right >left. Pt states that she has been having ongoing pain since 2022. Pt fell at work and needed to get knee surgery with Dr Treviño which caused her to stop treating her feet. Pt has been to a podiatrist Feburary 2023, had 2 cortisone shots to both feet.  Hx of plantar fascitis on right foot. Heel spur in both. Hx L ankle sprain x 2.  Denies recent trauma.  Ice to affected area. Advil for pain.  WB in sneakers.  8/31/2023; follow up on B/l feet. WB in sneakers. Pain to the right lateral/posterior ankle and left medial ankle. Numbness at times to the lateral aspect of the right foot. PT/HEP with improvement. Does not need medication for pain.  [FreeTextEntry1] : b/l feet Yes

## 2023-12-02 NOTE — ED ADULT TRIAGE NOTE - MEANS OF ARRIVAL
Avery catheter placed with MD order. Aseptic technique maintained. Two RNs at bedside during procedure. Catheter draining clear yellow urine. PT HHA assisted with cleaning patient and repositioning pt for comfort Received report from DEMI Martinez. Rapid response team called for hypoxia on BiPAP. 60 mg of propofol and 100 succinylcholine administered Pt intubated at 2057 by RRT. Pt taken to MICU at 2128 with ED RN, RRT MD, ED tech, RRT RN, ED RT. See RRT for further documentation. call placed to pharmacy regarding Zosyn STAT 30 minute dose order as pt already received initial dose yesterday at 2130, and empiric dose at 0245 today. Awaiting correction to Zosyn dosing Assumed pt care from previous RN.  Pt alert and oriented, stretcher side rails up, and in lowest locked position.  Safety maintained. Pt home health assistant at bedside with spouse.  Admitting physician at bedside discussing POC.  HHA updated on need for diet order and need for dietician per admitting Dr. 1st YASMEEN xavier. Pt BLUE swollen, brought to Admission dr attention. stretcher

## 2024-01-22 ENCOUNTER — APPOINTMENT (OUTPATIENT)
Dept: ORTHOPEDIC SURGERY | Facility: CLINIC | Age: 54
End: 2024-01-22
Payer: OTHER MISCELLANEOUS

## 2024-01-22 VITALS — BODY MASS INDEX: 33.47 KG/M2 | WEIGHT: 226 LBS | HEIGHT: 69 IN

## 2024-01-22 PROCEDURE — 99213 OFFICE O/P EST LOW 20 MIN: CPT

## 2024-01-23 NOTE — IMAGING
[de-identified] : The patient is a well appearing 52 year old female of their stated age. Patient ambulates with a normal gait. Negative straight leg raise bilateral  Surgical site: Left knee  Incision sites: Well healed   Range of motion: 0-145  Motor Testin/5 quad strength   Stability Testing: Stable ligamentous exam, slight valgus jog at 30 degrees   Swelling/Effusion: None   Tenderness to palpation: MCL tenderness, medial distal hamstring tenderness  Provocative testing: Negative   Right Calf: soft and nontender  Left Calf: soft and nontender    Neurovascular Examination: Grossly intact, 2+ distal pulses  Contralateral Extremity: Examination grossly benign   Assessment & Plan: The patient is approximately 8 months s/p left knee EUA arthroscopic partial medial meniscectomy (DOS: 23) and MCL laxity and medial hamstring tendonitis The patient's post-op plan, protocol and activity modifications have been thoroughly discussed and the patient expressed understanding. Patient will restart physical therapy for tendonitis. Continue HEP and stretching. Recommended the patient obtain a Reparel knee sleeve to reduce pain and inflammation. The patient is currently working. Patient is cleared for all activity. Return to gym and sports. Gradually advance activity as tolerated. Follow up 6 weeks.   The patient's current medication management of their orthopedic diagnosis was reviewed today: (1) We discussed a comprehensive treatment plan that included possible pharmaceutical management involving the use of prescription strength medications including but not limited to options such as oral Naprosyn 500mg BID, once daily Meloxicam 15 mg, or 500-650 mg Tylenol versus over the counter oral medications and topical prescription NSAID Pennsaid vs over the counter Voltaren gel.  Based on our extensive discussion, the patient declined prescription medication and will use over the counter Advil, Alleve, Voltaren Gel or Tylenol as directed. (2) There is a moderate risk of morbidity with further treatment, especially from use of prescription strength medications and possible side effects of these medications which include upset stomach with oral medications, skin reactions to topical medications and cardiac/renal issues with long term use. (3) I recommended that the patient follow-up with their medical physician to discuss any significant specific potential issues with long term medication use such as interactions with current medications or with exacerbation of underlying medical comorbidities. (4) The benefits and risks associated with use of injectable, oral or topical, prescription and over the counter anti-inflammatory medications were discussed with the patient. The patient voiced understanding of the risks including but not limited to bleeding, stroke, kidney dysfunction, heart disease, and were referred to the black box warning label for further information.  I, Avelina Jones, attest that this documentation has been prepared under the direction and in the presence of Provider Dr. Willie Treviño.  The documentation recorded by the scribe accurately reflects the service IRadha PA-C, personally performed and the decisions made by me. The patient was seen by me under the direct supervision of Dr. Willie Treviño.

## 2024-01-23 NOTE — HISTORY OF PRESENT ILLNESS
[de-identified] : The patient is a 53 year old right hand dominant female who presents today for left knee follow up Date of Injury: 1/11/23; Date of Surgery: 5/17/23 Pain:    At Rest: 0/10  With Activity:  5/10  Mechanism of injury: Twisted knee while opening door at work This is a Work Related Injury being treated under Worker's Compensation. This is NOT an athletic injury occurring associated with an interscholastic or organized sports team. 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 Additional Information:  s/p left knee EUA arthroscopic PMM

## 2024-03-04 ENCOUNTER — APPOINTMENT (OUTPATIENT)
Dept: ORTHOPEDIC SURGERY | Facility: CLINIC | Age: 54
End: 2024-03-04
Payer: OTHER MISCELLANEOUS

## 2024-03-04 VITALS — HEIGHT: 69 IN | BODY MASS INDEX: 33.47 KG/M2 | WEIGHT: 226 LBS

## 2024-03-04 DIAGNOSIS — M76.892 OTHER SPECIFIED ENTHESOPATHIES OF LEFT LOWER LIMB, EXCLUDING FOOT: ICD-10-CM

## 2024-03-04 DIAGNOSIS — S83.242A OTHER TEAR OF MEDIAL MENISCUS, CURRENT INJURY, LEFT KNEE, INITIAL ENCOUNTER: ICD-10-CM

## 2024-03-04 PROCEDURE — 99213 OFFICE O/P EST LOW 20 MIN: CPT

## 2024-03-04 NOTE — HISTORY OF PRESENT ILLNESS
[de-identified] : The patient is a 53 year old right hand dominant female who presents today for left knee follow up Date of Injury: 1/11/23; Date of Surgery: 5/17/23 Pain:    At Rest: 0/10  With Activity:  5/10  Mechanism of injury: Twisted knee while opening door at work This is a Work Related Injury being treated under Worker's Compensation. This is NOT an athletic injury occurring associated with an interscholastic or organized sports team. 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   Additional Information:  s/p left knee EUA arthroscopic PMM

## 2024-03-04 NOTE — WORK
[Torn Ligament/Tendon/Muscle] : torn ligament, tendon or muscle [Are consistent with the injury] : are consistent with the injury [Was the competent medical cause of the injury] : was the competent medical cause of the injury [Consistent with my objective findings] : consistent with my objective findings [Partial] : partial [Does not reveal pre-existing condition(s) that may affect treatment/prognosis] : does not reveal pre-existing condition(s) that may affect treatment/prognosis [Can return to work without limitations on ______] : can return to work without limitations on [unfilled] [Patient] : patient [No Rx restrictions] : No Rx restrictions. [I provided the services listed above] :  I provided the services listed above.

## 2024-03-04 NOTE — IMAGING
[de-identified] : The patient is a well appearing 52 year old female of their stated age. Patient ambulates with a normal gait. Negative straight leg raise bilateral  Surgical site: Left knee  Incision sites: Well healed   Range of motion: 0-145  Motor Testin/5 quad strength   Stability Testing: Stable ligamentous exam   Swelling/Effusion: None   Tenderness to palpation: medial distal hamstring tenderness  Provocative testing: Negative   Right Calf: soft and nontender  Left Calf: soft and nontender    Neurovascular Examination: Grossly intact, 2+ distal pulses  Contralateral Extremity: Examination grossly benign   Assessment & Plan: The patient is approximately 10 months s/p left knee EUA arthroscopic partial medial meniscectomy (DOS: 23) and MCL laxity and medial hamstring tendonitis The patient's post-op plan, protocol and activity modifications have been thoroughly discussed and the patient expressed understanding. Patient will finish out physical therapy, transitioning into strengthening and stretching. Discussed the importance of working on endurance. Recommended continued use of Reparel knee. We reviewed the benefits of weight loss to further alleviate patellofemoral symptoms. The patient is currently working. Gradually advance activity as tolerated, she has no activity restrictions at this time. The patient will follow up on a PRN basis unless new symptoms arise.    The patient's current medication management of their orthopedic diagnosis was reviewed today: (1) We discussed a comprehensive treatment plan that included possible pharmaceutical management involving the use of prescription strength medications including but not limited to options such as oral Naprosyn 500mg BID, once daily Meloxicam 15 mg, or 500-650 mg Tylenol versus over the counter oral medications and topical prescription NSAID Pennsaid vs over the counter Voltaren gel.  Based on our extensive discussion, the patient declined prescription medication and will use over the counter Advil, Alleve, Voltaren Gel or Tylenol as directed. (2) There is a moderate risk of morbidity with further treatment, especially from use of prescription strength medications and possible side effects of these medications which include upset stomach with oral medications, skin reactions to topical medications and cardiac/renal issues with long term use. (3) I recommended that the patient follow-up with their medical physician to discuss any significant specific potential issues with long term medication use such as interactions with current medications or with exacerbation of underlying medical comorbidities. (4) The benefits and risks associated with use of injectable, oral or topical, prescription and over the counter anti-inflammatory medications were discussed with the patient. The patient voiced understanding of the risks including but not limited to bleeding, stroke, kidney dysfunction, heart disease, and were referred to the black box warning label for further information.  [facility] attest that this documentation has been prepared under the direction and in the presence of Provider Dr. Willie Treviño.   The documentation recorded by the scribe accurately reflects the services IDr. Willie, personally performed and the decisions made by me.

## 2025-02-12 ENCOUNTER — APPOINTMENT (OUTPATIENT)
Dept: ORTHOPEDIC SURGERY | Facility: CLINIC | Age: 55
End: 2025-02-12
Payer: COMMERCIAL

## 2025-02-12 DIAGNOSIS — S93.402A SPRAIN OF UNSPECIFIED LIGAMENT OF LEFT ANKLE, INITIAL ENCOUNTER: ICD-10-CM

## 2025-02-12 PROCEDURE — 73610 X-RAY EXAM OF ANKLE: CPT | Mod: RT

## 2025-02-12 PROCEDURE — L1902: CPT | Mod: RT

## 2025-02-12 PROCEDURE — 99213 OFFICE O/P EST LOW 20 MIN: CPT

## 2025-02-12 RX ORDER — LEVOTHYROXINE SODIUM 0.17 MG/1
TABLET ORAL
Refills: 0 | Status: ACTIVE | COMMUNITY

## 2025-02-12 RX ORDER — MELOXICAM 15 MG/1
15 TABLET ORAL DAILY
Qty: 30 | Refills: 1 | Status: ACTIVE | COMMUNITY
Start: 2025-02-12 | End: 2025-04-13

## 2025-02-12 RX ORDER — OLMESARTAN MEDOXOMIL 40 MG/1
TABLET, FILM COATED ORAL
Refills: 0 | Status: ACTIVE | COMMUNITY

## 2025-03-05 ENCOUNTER — APPOINTMENT (OUTPATIENT)
Dept: ORTHOPEDIC SURGERY | Facility: CLINIC | Age: 55
End: 2025-03-05
Payer: COMMERCIAL

## 2025-03-05 DIAGNOSIS — S93.402D SPRAIN OF UNSPECIFIED LIGAMENT OF LEFT ANKLE, SUBSEQUENT ENCOUNTER: ICD-10-CM

## 2025-03-05 PROCEDURE — 99213 OFFICE O/P EST LOW 20 MIN: CPT

## 2025-04-02 ENCOUNTER — APPOINTMENT (OUTPATIENT)
Dept: ORTHOPEDIC SURGERY | Facility: CLINIC | Age: 55
End: 2025-04-02

## 2025-07-07 ENCOUNTER — APPOINTMENT (OUTPATIENT)
Dept: ORTHOPEDIC SURGERY | Facility: CLINIC | Age: 55
End: 2025-07-07
Payer: COMMERCIAL

## 2025-07-07 VITALS — BODY MASS INDEX: 31.55 KG/M2 | HEIGHT: 69 IN | WEIGHT: 213 LBS

## 2025-07-07 PROBLEM — M25.561 RIGHT KNEE PAIN: Status: ACTIVE | Noted: 2025-07-07

## 2025-07-07 PROCEDURE — 73564 X-RAY EXAM KNEE 4 OR MORE: CPT | Mod: RT

## 2025-07-07 PROCEDURE — 99214 OFFICE O/P EST MOD 30 MIN: CPT

## 2025-07-07 RX ORDER — TIRZEPATIDE 15 MG/.5ML
INJECTION, SOLUTION SUBCUTANEOUS
Refills: 0 | Status: ACTIVE | COMMUNITY

## 2025-07-09 ENCOUNTER — APPOINTMENT (OUTPATIENT)
Dept: MRI IMAGING | Facility: CLINIC | Age: 55
End: 2025-07-09
Payer: COMMERCIAL

## 2025-07-09 PROCEDURE — 73721 MRI JNT OF LWR EXTRE W/O DYE: CPT | Mod: RT

## 2025-07-14 ENCOUNTER — APPOINTMENT (OUTPATIENT)
Dept: ORTHOPEDIC SURGERY | Facility: CLINIC | Age: 55
End: 2025-07-14
Payer: COMMERCIAL

## 2025-07-14 VITALS — HEIGHT: 69 IN | WEIGHT: 213 LBS | BODY MASS INDEX: 31.55 KG/M2

## 2025-07-14 PROBLEM — M23.006 PERIMENISCAL CYST OF RIGHT KNEE: Status: ACTIVE | Noted: 2025-07-14

## 2025-07-14 PROCEDURE — 99214 OFFICE O/P EST MOD 30 MIN: CPT

## 2025-07-14 RX ORDER — OLMESARTAN MEDOXOMIL AND HYDROCHLOROTHIAZIDE 12.5; 4 MG/1; MG/1
40-12.5 TABLET, FILM COATED ORAL
Refills: 0 | Status: ACTIVE | COMMUNITY

## 2025-08-07 DIAGNOSIS — S83.271A COMPLEX TEAR OF LATERAL MENISCUS, CURRENT INJURY, RIGHT KNEE, INITIAL ENCOUNTER: ICD-10-CM

## 2025-08-07 RX ORDER — ONDANSETRON 4 MG/1
4 TABLET ORAL
Qty: 15 | Refills: 0 | Status: ACTIVE | COMMUNITY
Start: 2025-08-07 | End: 1900-01-01

## 2025-08-07 RX ORDER — DOCUSATE SODIUM 100 MG/1
100 CAPSULE ORAL 3 TIMES DAILY
Qty: 21 | Refills: 0 | Status: ACTIVE | COMMUNITY
Start: 2025-08-07 | End: 1900-01-01

## 2025-08-07 RX ORDER — HYDROCODONE BITARTRATE AND ACETAMINOPHEN 5; 325 MG/1; MG/1
5-325 TABLET ORAL
Qty: 20 | Refills: 0 | Status: ACTIVE | COMMUNITY
Start: 2025-08-07 | End: 1900-01-01

## 2025-08-13 ENCOUNTER — APPOINTMENT (OUTPATIENT)
Dept: ORTHOPEDIC SURGERY | Facility: AMBULATORY SURGERY CENTER | Age: 55
End: 2025-08-13

## 2025-08-13 PROCEDURE — 29881 ARTHRS KNE SRG MNISECTMY M/L: CPT | Mod: AS,RT

## 2025-08-13 PROCEDURE — 29876 ARTHRS KNEE SURG SYNVCT MAJ: CPT | Mod: AS,RT

## 2025-08-13 PROCEDURE — 29881 ARTHRS KNE SRG MNISECTMY M/L: CPT | Mod: RT

## 2025-08-13 PROCEDURE — 29876 ARTHRS KNEE SURG SYNVCT MAJ: CPT | Mod: RT

## 2025-08-19 ENCOUNTER — TRANSCRIPTION ENCOUNTER (OUTPATIENT)
Age: 55
End: 2025-08-19

## 2025-08-26 ENCOUNTER — APPOINTMENT (OUTPATIENT)
Dept: ORTHOPEDIC SURGERY | Facility: CLINIC | Age: 55
End: 2025-08-26
Payer: COMMERCIAL

## 2025-08-26 VITALS — HEIGHT: 69 IN | BODY MASS INDEX: 31.55 KG/M2 | WEIGHT: 213 LBS

## 2025-08-26 DIAGNOSIS — S83.271A COMPLEX TEAR OF LATERAL MENISCUS, CURRENT INJURY, RIGHT KNEE, INITIAL ENCOUNTER: ICD-10-CM

## 2025-08-26 PROCEDURE — 99024 POSTOP FOLLOW-UP VISIT: CPT
